# Patient Record
Sex: FEMALE | Race: WHITE
[De-identification: names, ages, dates, MRNs, and addresses within clinical notes are randomized per-mention and may not be internally consistent; named-entity substitution may affect disease eponyms.]

---
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
## 2021-06-20 ENCOUNTER — HOSPITAL ENCOUNTER (EMERGENCY)
Dept: HOSPITAL 63 - ER | Age: 26
Discharge: HOME | End: 2021-06-20
Payer: COMMERCIAL

## 2021-06-20 VITALS — HEIGHT: 65 IN | WEIGHT: 179.46 LBS | BODY MASS INDEX: 29.9 KG/M2

## 2021-06-20 VITALS — SYSTOLIC BLOOD PRESSURE: 121 MMHG | DIASTOLIC BLOOD PRESSURE: 66 MMHG

## 2021-06-20 DIAGNOSIS — Z88.0: ICD-10-CM

## 2021-06-20 DIAGNOSIS — F17.210: ICD-10-CM

## 2021-06-20 DIAGNOSIS — R10.32: Primary | ICD-10-CM

## 2021-06-20 DIAGNOSIS — Z98.890: ICD-10-CM

## 2021-06-20 LAB
ALBUMIN SERPL-MCNC: 3.1 G/DL (ref 3.4–5)
ALBUMIN/GLOB SERPL: 0.7 {RATIO} (ref 1–1.7)
ALP SERPL-CCNC: 91 U/L (ref 46–116)
ALT SERPL-CCNC: 19 U/L (ref 14–59)
ANION GAP SERPL CALC-SCNC: 8 MMOL/L (ref 6–14)
APTT PPP: YELLOW S
AST SERPL-CCNC: 13 U/L (ref 15–37)
BACTERIA #/AREA URNS HPF: 0 /HPF
BASOPHILS # BLD AUTO: 0 X10^3/UL (ref 0–0.2)
BASOPHILS NFR BLD: 1 % (ref 0–3)
BILIRUB SERPL-MCNC: 0.8 MG/DL (ref 0.2–1)
BILIRUB UR QL STRIP: (no result)
BUN/CREAT SERPL: 13 (ref 6–20)
CA-I SERPL ISE-MCNC: 13 MG/DL (ref 7–20)
CALCIUM SERPL-MCNC: 8.6 MG/DL (ref 8.5–10.1)
CHLORIDE SERPL-SCNC: 103 MMOL/L (ref 98–107)
CO2 SERPL-SCNC: 26 MMOL/L (ref 21–32)
CREAT SERPL-MCNC: 1 MG/DL (ref 0.6–1)
EOSINOPHIL NFR BLD: 0 % (ref 0–3)
EOSINOPHIL NFR BLD: 0 X10^3/UL (ref 0–0.7)
ERYTHROCYTE [DISTWIDTH] IN BLOOD BY AUTOMATED COUNT: 16.1 % (ref 11.5–14.5)
FIBRINOGEN PPP-MCNC: CLEAR MG/DL
GFR SERPLBLD BASED ON 1.73 SQ M-ARVRAT: 68.1 ML/MIN
GLOBULIN SER-MCNC: 4.4 G/DL (ref 2.2–3.8)
GLUCOSE SERPL-MCNC: 80 MG/DL (ref 70–99)
GLUCOSE UR STRIP-MCNC: (no result) MG/DL
HCT VFR BLD CALC: 33.2 % (ref 36–47)
HGB BLD-MCNC: 10.6 G/DL (ref 12–15.5)
LYMPHOCYTES # BLD: 2.3 X10^3/UL (ref 1–4.8)
LYMPHOCYTES NFR BLD AUTO: 23 % (ref 24–48)
MCH RBC QN AUTO: 24 PG (ref 25–35)
MCHC RBC AUTO-ENTMCNC: 32 G/DL (ref 31–37)
MCV RBC AUTO: 76 FL (ref 79–100)
MONO #: 1.2 X10^3/UL (ref 0–1.1)
MONOCYTES NFR BLD: 12 % (ref 0–9)
NEUT #: 6.3 X10^3UL (ref 1.8–7.7)
NEUTROPHILS NFR BLD AUTO: 64 % (ref 31–73)
NITRITE UR QL STRIP: (no result)
PLATELET # BLD AUTO: 185 X10^3/UL (ref 140–400)
POTASSIUM SERPL-SCNC: 3.4 MMOL/L (ref 3.5–5.1)
PROT SERPL-MCNC: 7.5 G/DL (ref 6.4–8.2)
RBC # BLD AUTO: 4.34 X10^6/UL (ref 3.5–5.4)
RBC #/AREA URNS HPF: (no result) /HPF (ref 0–2)
SODIUM SERPL-SCNC: 137 MMOL/L (ref 136–145)
SP GR UR STRIP: 1.01
SQUAMOUS #/AREA URNS LPF: (no result) /LPF
UROBILINOGEN UR-MCNC: 1 MG/DL
WBC # BLD AUTO: 9.9 X10^3/UL (ref 4–11)
WBC #/AREA URNS HPF: (no result) /HPF (ref 0–4)

## 2021-06-20 PROCEDURE — 80053 COMPREHEN METABOLIC PANEL: CPT

## 2021-06-20 PROCEDURE — 74177 CT ABD & PELVIS W/CONTRAST: CPT

## 2021-06-20 PROCEDURE — 85025 COMPLETE CBC W/AUTO DIFF WBC: CPT

## 2021-06-20 PROCEDURE — 99285 EMERGENCY DEPT VISIT HI MDM: CPT

## 2021-06-20 PROCEDURE — 81001 URINALYSIS AUTO W/SCOPE: CPT

## 2021-06-20 PROCEDURE — 87086 URINE CULTURE/COLONY COUNT: CPT

## 2021-06-20 PROCEDURE — 96360 HYDRATION IV INFUSION INIT: CPT

## 2021-06-20 PROCEDURE — 36415 COLL VENOUS BLD VENIPUNCTURE: CPT

## 2021-06-20 NOTE — RAD
CT abdomen pelvis with contrast dated 6/20/2021.



No comparison available.



Clinical data indication: Left lower quadrant pain.



TECHNIQUE:



Contiguous axial imaging the M pelvis performed after the administration of 75 cc Omnipaque 300. 

One or more of the following individualized dose reduction techniques were utilized for this examinat
ion:  

1. Automated exposure control  

2. Adjustment of the mA and/or kV according to patient size  

3. Use of iterative reconstruction technique.



FINDINGS:



Limited images of lung bases are clear. Heart size within normal limits. No pleural or pericardial ef
fusion.



Liver, spleen, pancreas, adrenal glands unremarkable. There is vague areas of striated low density al
jo-ann the cortical medullary junctions of each kidney. Largest is located at the left kidney upper pole
 and best seen on coronal images 37 through 43. No stone or hydronephrosis. No perinephric fluid adi
ection.



Unopacified GI tract normal in caliber and contour. No bowel wall thickening. Appendix normal in jama
fela. No ascites or lymphadenopathy. Abdominal aorta normal in caliber.



Images of pelvis show nondistended urinary bladder. Uterus and adnexa are unremarkable. No free fluid
 or pelvic lymphadenopathy. Questionable mild wall thickening of the urinary bladder.



Bone windows show no acute findings.



IMPRESSION:

1. There is some vague low density along the cortical medullary junctions of each kidney with more fo
humberto area of low density at the upper pole left kidney. Findings are nonspecific but could be related 
to pyelonephritis. Correlate with urinalysis. Follow-up imaging may be warranted to ensure resolution
.

2. Mild wall thickening of the urinary bladder. Consider acute or chronic cystitis.

3. Otherwise no acute findings. Normal appendix.



Electronically signed by: Solomon Cortés MD (6/20/2021 2:50 PM) [id number]

## 2021-06-20 NOTE — PHYS DOC
Past History


Past Medical History:  No Pertinent History


Past Surgical History:  , Tonsillectomy, Other


Additional Past Surgical Histo:  is doing teeth


Smoking:  Cigarettes


Alcohol Use:  Occasionally


Drug Use:  Methamphetamine





General Adult


EDM:


Chief Complaint:  ABDOMINAL PAIN





HPI:


HPI:


25-year-old female presents with a left-sided abdominal pain.  She has had this 

pain for a couple days but it is worse today.  She is concerned about an 

infection.  She tells me that she has an abscessed tooth but does not explain 

with the cyst of the abdomen.  Patient did have a vaginal birth after  

recently.  She further states that she had loss of bladder today which is new 

for her.  She denies dysuria.  Her bowel movements have been normal.  She denies

fever or chills.





Review of Systems:


Review of Systems:


Constitutional:  Denies fever or chills 


Eyes:  Denies change in visual acuity 


HENT:  Denies nasal congestion or sore throat 


Respiratory:  Denies cough or shortness of breath 


Cardiovascular:  Denies chest pain or edema 


GI: Left-sided abdominal pain. Denies nausea, vomiting, bloody stools or 

diarrhea 


: Denies dysuria 


Musculoskeletal:  Denies back pain or joint pain 


Integument:  Denies rash 


Neurologic:  Denies headache, focal weakness or sensory changes 


Endocrine:  Denies polyuria or polydipsia 


Lymphatic:  Denies swollen glands 


Psychiatric:  Denies depression or anxiety





Allergies:


Allergies:





Allergies








Coded Allergies Type Severity Reaction Last Updated Verified


 


  Penicillins Allergy Intermediate  19 Yes











Physical Exam:


PE:





Constitutional: Well developed, well nourished, obese, no acute distress, non-

toxic appearance. []


HENT: Normocephalic, atraumatic, bilateral external ears normal, oropharynx 

moist, no oral exudates, nose normal. []


Eyes: PERRLA, EOMI, conjunctiva normal, no discharge. [] 


Neck: Normal range of motion, no tenderness, supple, no stridor. [] 


Cardiovascular: Heart rate regular rhythm, no murmur []


Lungs & Thorax:  Bilateral breath sounds clear to auscultation []


Abdomen: Bowel sounds normal, soft, left side tenderness without rebound or 

guarding, no masses, no pulsatile masses. [] 


Skin: Warm, dry, no erythema, no rash. [] 


Back: No tenderness, no CVA tenderness. [] 


Extremities: No tenderness, no cyanosis, no clubbing, ROM intact, no edema. [] 


Neurologic: Alert and oriented X 3, normal motor function, normal sensory 

function, no focal deficits noted. []


Psychologic: Affect normal, judgement normal, mood anxious. []





EKG:


EKG:


[]





Radiology/Procedures:


Radiology/Procedures:


[]


Impressions:


CT abdomen pelvis with contrast dated 2021.





No comparison available.





Clinical data indication: Left lower quadrant pain.





TECHNIQUE:





Contiguous axial imaging the M pelvis performed after the administration of 75 

cc Omnipaque 300. 


One or more of the following individualized dose reduction techniques were 

utilized for this examination:  


1. Automated exposure control  


2. Adjustment of the mA and/or kV according to patient size  


3. Use of iterative reconstruction technique.





FINDINGS:





Limited images of lung bases are clear. Heart size within normal limits. No 

pleural or pericardial effusion.





Liver, spleen, pancreas, adrenal glands unremarkable. There is vague areas of 

striated low density along the cortical medullary junctions of each kidney. 

Largest is located at the left kidney upper pole and best seen on coronal images

 37 through 43. No stone or hydronephrosis. No perinephric fluid collection.





Unopacified GI tract normal in caliber and contour. No bowel wall thickening. 

Appendix normal in caliber. No ascites or lymphadenopathy. Abdominal aorta 

normal in caliber.





Images of pelvis show nondistended urinary bladder. Uterus and adnexa are 

unremarkable. No free fluid or pelvic lymphadenopathy. Questionable mild wall 

thickening of the urinary bladder.





Bone windows show no acute findings.





IMPRESSION:


1. There is some vague low density along the cortical medullary junctions of 

each kidney with more focal area of low density at the upper pole left kidney. 

Findings are nonspecific but could be related to pyelonephritis. Correlate with 

urinalysis. Follow-up imaging may be warranted to ensure resolution.


2. Mild wall thickening of the urinary bladder. Consider acute or chronic 

cystitis.


3. Otherwise no acute findings. Normal appendix.





Electronically signed by: Ivon Cortés MD (2021 2:50 PM) [id number]














DICTATED AND SIGNED BY:     IVON CROTÉS MD


DATE:     21 1447





CC: KIM CAMERON DO; PCP,NO ~MTH0 0





Heart Score:


C/O Chest Pain:  N/A


Risk Factors:


Risk Factors:  DM, Current or recent (<one month) smoker, HTN, HLP, family 

history of CAD, obesity.


Risk Scores:


Score 0 - 3:  2.5% MACE over next 6 weeks - Discharge Home


Score 4 - 6:  20.3% MACE over next 6 weeks - Admit for Clinical Observation


Score 7 - 10:  72.7% MACE over next 6 weeks - Early Invasive Strategies





Course & Med Decision Making:


Course & Med Decision Making


Pertinent Labs and Imaging studies reviewed. (See chart for details)


The patient's labs are unremarkable.  Her urinalysis is negative for infection. 

 CT scan did not show any obvious acute findings.  It does mention some abnormal

 findings of the kidneys which could suggest infection.  Similar findings in the

 bladder.  The urinalysis however is negative for infection.  I do not see any 

life-threatening illness at this time.  The patient does not meet admission 

criteria.  I have advised her to follow-up with her primary care physician as 

needed.  She is stable for discharge at this time.


[]





Dragon Disclaimer:


Dragon Disclaimer:


This electronic medical record was generated, in whole or in part, using a voice

 recognition dictation system.





Departure


Departure:


Impression:  


   Primary Impression:  


   Abdominal pain, left lateral


Disposition:   HOME / SELF CARE / HOMELESS


Condition:  STABLE


Referrals:  


PCP,NO (PCP)


Patient Instructions:  Abdominal Pain, Easy-to-Read











KIM CAMERON DO                 2021 13:32